# Patient Record
Sex: MALE | Race: BLACK OR AFRICAN AMERICAN | NOT HISPANIC OR LATINO | Employment: FULL TIME | URBAN - METROPOLITAN AREA
[De-identification: names, ages, dates, MRNs, and addresses within clinical notes are randomized per-mention and may not be internally consistent; named-entity substitution may affect disease eponyms.]

---

## 2019-08-28 ENCOUNTER — HOSPITAL ENCOUNTER (EMERGENCY)
Facility: HOSPITAL | Age: 29
Discharge: HOME | End: 2019-08-28
Attending: EMERGENCY MEDICINE
Payer: MEDICAID

## 2019-08-28 VITALS
TEMPERATURE: 99 F | RESPIRATION RATE: 18 BRPM | HEART RATE: 52 BPM | SYSTOLIC BLOOD PRESSURE: 127 MMHG | OXYGEN SATURATION: 98 % | WEIGHT: 230 LBS | HEIGHT: 72 IN | BODY MASS INDEX: 31.15 KG/M2 | DIASTOLIC BLOOD PRESSURE: 86 MMHG

## 2019-08-28 DIAGNOSIS — R10.13 EPIGASTRIC ABDOMINAL PAIN: Primary | ICD-10-CM

## 2019-08-28 LAB
ALBUMIN SERPL BCP-MCNC: 4.4 G/DL (ref 3.5–5.2)
ALP SERPL-CCNC: 63 U/L (ref 55–135)
ALT SERPL W/O P-5'-P-CCNC: 29 U/L (ref 10–44)
ANION GAP SERPL CALC-SCNC: 7 MMOL/L (ref 8–16)
AST SERPL-CCNC: 29 U/L (ref 10–40)
BASOPHILS # BLD AUTO: 0.01 K/UL (ref 0–0.2)
BASOPHILS NFR BLD: 0.2 % (ref 0–1.9)
BILIRUB SERPL-MCNC: 0.4 MG/DL (ref 0.1–1)
BUN SERPL-MCNC: 10 MG/DL (ref 6–20)
CALCIUM SERPL-MCNC: 10.5 MG/DL (ref 8.7–10.5)
CHLORIDE SERPL-SCNC: 103 MMOL/L (ref 95–110)
CO2 SERPL-SCNC: 30 MMOL/L (ref 23–29)
CREAT SERPL-MCNC: 1.1 MG/DL (ref 0.5–1.4)
DIFFERENTIAL METHOD: NORMAL
EOSINOPHIL # BLD AUTO: 0.1 K/UL (ref 0–0.5)
EOSINOPHIL NFR BLD: 0.8 % (ref 0–8)
ERYTHROCYTE [DISTWIDTH] IN BLOOD BY AUTOMATED COUNT: 13.2 % (ref 11.5–14.5)
EST. GFR  (AFRICAN AMERICAN): >60 ML/MIN/1.73 M^2
EST. GFR  (NON AFRICAN AMERICAN): >60 ML/MIN/1.73 M^2
GLUCOSE SERPL-MCNC: 90 MG/DL (ref 70–110)
HCT VFR BLD AUTO: 44.7 % (ref 40–54)
HGB BLD-MCNC: 14.7 G/DL (ref 14–18)
LIPASE SERPL-CCNC: 23 U/L (ref 4–60)
LYMPHOCYTES # BLD AUTO: 2.2 K/UL (ref 1–4.8)
LYMPHOCYTES NFR BLD: 34.6 % (ref 18–48)
MCH RBC QN AUTO: 29.7 PG (ref 27–31)
MCHC RBC AUTO-ENTMCNC: 32.9 G/DL (ref 32–36)
MCV RBC AUTO: 90 FL (ref 82–98)
MONOCYTES # BLD AUTO: 0.4 K/UL (ref 0.3–1)
MONOCYTES NFR BLD: 6.7 % (ref 4–15)
NEUTROPHILS # BLD AUTO: 3.6 K/UL (ref 1.8–7.7)
NEUTROPHILS NFR BLD: 57.7 % (ref 38–73)
PLATELET # BLD AUTO: 282 K/UL (ref 150–350)
PMV BLD AUTO: 9.9 FL (ref 9.2–12.9)
POTASSIUM SERPL-SCNC: 4.6 MMOL/L (ref 3.5–5.1)
PROT SERPL-MCNC: 8.8 G/DL (ref 6–8.4)
RBC # BLD AUTO: 4.95 M/UL (ref 4.6–6.2)
SODIUM SERPL-SCNC: 140 MMOL/L (ref 136–145)
WBC # BLD AUTO: 6.28 K/UL (ref 3.9–12.7)

## 2019-08-28 PROCEDURE — 96375 TX/PRO/DX INJ NEW DRUG ADDON: CPT

## 2019-08-28 PROCEDURE — 80053 COMPREHEN METABOLIC PANEL: CPT

## 2019-08-28 PROCEDURE — 83690 ASSAY OF LIPASE: CPT

## 2019-08-28 PROCEDURE — S0028 INJECTION, FAMOTIDINE, 20 MG: HCPCS | Performed by: PHYSICIAN ASSISTANT

## 2019-08-28 PROCEDURE — 99284 EMERGENCY DEPT VISIT MOD MDM: CPT | Mod: 25

## 2019-08-28 PROCEDURE — 63600175 PHARM REV CODE 636 W HCPCS: Performed by: PHYSICIAN ASSISTANT

## 2019-08-28 PROCEDURE — 85025 COMPLETE CBC W/AUTO DIFF WBC: CPT

## 2019-08-28 PROCEDURE — 25000003 PHARM REV CODE 250: Performed by: PHYSICIAN ASSISTANT

## 2019-08-28 PROCEDURE — 96374 THER/PROPH/DIAG INJ IV PUSH: CPT

## 2019-08-28 RX ORDER — FAMOTIDINE 10 MG/ML
20 INJECTION INTRAVENOUS
Status: COMPLETED | OUTPATIENT
Start: 2019-08-28 | End: 2019-08-28

## 2019-08-28 RX ORDER — FENTANYL CITRATE 50 UG/ML
50 INJECTION, SOLUTION INTRAMUSCULAR; INTRAVENOUS
Status: DISCONTINUED | OUTPATIENT
Start: 2019-08-28 | End: 2019-08-28

## 2019-08-28 RX ORDER — FAMOTIDINE 20 MG/1
20 TABLET, FILM COATED ORAL 2 TIMES DAILY
Qty: 20 TABLET | Refills: 0 | OUTPATIENT
Start: 2019-08-28 | End: 2020-11-04

## 2019-08-28 RX ORDER — ACETAMINOPHEN 500 MG
1000 TABLET ORAL EVERY 8 HOURS
Refills: 0 | COMMUNITY
Start: 2019-08-28 | End: 2019-09-04

## 2019-08-28 RX ORDER — SUCRALFATE 1 G/1
1 TABLET ORAL
Qty: 30 TABLET | Refills: 0 | Status: SHIPPED | OUTPATIENT
Start: 2019-08-28

## 2019-08-28 RX ORDER — KETOROLAC TROMETHAMINE 30 MG/ML
15 INJECTION, SOLUTION INTRAMUSCULAR; INTRAVENOUS
Status: COMPLETED | OUTPATIENT
Start: 2019-08-28 | End: 2019-08-28

## 2019-08-28 RX ADMIN — LIDOCAINE HYDROCHLORIDE: 20 SOLUTION ORAL; TOPICAL at 11:08

## 2019-08-28 RX ADMIN — FAMOTIDINE 20 MG: 10 INJECTION INTRAVENOUS at 11:08

## 2019-08-28 RX ADMIN — KETOROLAC TROMETHAMINE 15 MG: 30 INJECTION, SOLUTION INTRAMUSCULAR; INTRAVENOUS at 01:08

## 2019-08-28 NOTE — ED PROVIDER NOTES
"Encounter Date: 8/28/2019    SCRIBE #1 NOTE: I, Roxanne Hatfield, am scribing for, and in the presence of,  DEVON Romero. I have scribed the following portions of the note - Other sections scribed: HPI,ROS, PE.       History     Chief Complaint   Patient presents with    Abdominal Pain     Sharp abdominal pain for last few days, denying n/v/d, rates pain 10/10     CC: Abdominal pain    HPI:  This is a 29 y.o. male with no pertinent PMHx who presents to the Emergency Department with a cc of epigastric abdominal pain that radiates to his left upper abdomen and through his back that has been intermittent for the past 4 days. Rates 10/10 in severity. Pt reports he was woken up from his sleep due to his pain 8 hours ago; he notes his pain has worsened and has been constant today. Pt notes when he eats he gets heartburn and his pain also worsens. He notes it feels like "something is in me." Pt denies nausea, vomiting, diarrhea, melena, hematochezia, dysuria, penile discharge, chest pain, or shortness of breath. Symptoms are worsened by sitting, movement, and turning of his body. Denies taking any recent medication prior to arrival. Denies recent abdominal surgeries or trauma to the area. Patient denies prior history of similar symptoms. Denies any alleviating factors. Reports he occasionally drinks beers and denies consuming water often.     The history is provided by the patient. No  was used.     Review of patient's allergies indicates:  No Known Allergies  History reviewed. No pertinent past medical history.  History reviewed. No pertinent surgical history.  History reviewed. No pertinent family history.  Social History     Tobacco Use    Smoking status: Current Every Day Smoker   Substance Use Topics    Alcohol use: Yes    Drug use: Never     Review of Systems   Constitutional: Negative for fever.   HENT: Negative for sore throat.    Respiratory: Negative for shortness of breath.  "   Cardiovascular: Negative for chest pain.   Gastrointestinal: Positive for abdominal pain. Negative for diarrhea, nausea and vomiting.   Genitourinary: Negative for discharge and dysuria.        (-)Melena  (-)Hematochezia   Musculoskeletal: Positive for back pain.   Skin: Negative for rash.   Neurological: Negative for weakness.   Hematological: Does not bruise/bleed easily.       Physical Exam     Initial Vitals [08/28/19 0951]   BP Pulse Resp Temp SpO2   137/84 76 16 98.1 °F (36.7 °C) 99 %      MAP       --         Physical Exam    Nursing note and vitals reviewed.  Constitutional: He appears well-developed and well-nourished. No distress.   HENT:   Head: Normocephalic and atraumatic.   Right Ear: External ear normal.   Left Ear: External ear normal.   Nose: Nose normal.   Eyes: Conjunctivae and EOM are normal.   Neck: Neck supple.   Cardiovascular: Normal rate and normal heart sounds.   Pulmonary/Chest: No stridor.   Abdominal: There is tenderness in the epigastric area. There is negative Ortiz's sign.   Musculoskeletal:        Cervical back: Normal.        Thoracic back: Normal.        Lumbar back: Normal.   Neurological: He is alert and oriented to person, place, and time. No cranial nerve deficit.   Skin: Skin is warm. No pallor.   Psychiatric: He has a normal mood and affect. Thought content normal.         ED Course   Procedures  Labs Reviewed   COMPREHENSIVE METABOLIC PANEL - Abnormal; Notable for the following components:       Result Value    CO2 30 (*)     Total Protein 8.8 (*)     Anion Gap 7 (*)     All other components within normal limits   CBC W/ AUTO DIFFERENTIAL   LIPASE          Imaging Results          X-Ray Chest 1 View (Final result)  Result time 08/28/19 12:51:27    Final result by Hamlet Long MD (08/28/19 12:51:27)                 Impression:      No acute cardiopulmonary processes.      Electronically signed by: Hamlet Long MD  Date:    08/28/2019  Time:    12:51             Narrative:     EXAMINATION:  XR CHEST 1 VIEW    CLINICAL HISTORY:  Epigastric pain    TECHNIQUE:  Single frontal view of the chest was performed.    COMPARISON:  None    FINDINGS:  Cardiomediastinal silhouette is within normal limits.  There is no tracheal abnormality.    Lungs are well inflated.  No lobar consolidations or pneumothorax or pulmonary vascular congestion or pleural effusions.  The visualized ribs demonstrate no significant abnormalities.                                     Medications   famotidine (PF) injection 20 mg (20 mg Intravenous Given 8/28/19 1139)   (pyxis) gi cocktail (mylanta 30 mL, lidocaine 2 % viscous 10 mL, dicyclomine 10 mL) 50 mL ( Oral Given 8/28/19 1138)   ketorolac injection 15 mg (15 mg Intravenous Given 8/28/19 1307)          APC / Resident Notes:   Patient presents to the ER for evaluation of epigastric abdominal pain x4 days.  Patient says this pain was initially intermittent, but has been constant since early this morning.  Patient's pain is worse with sitting, movements, eating.  I have considered GERD/gastritis,  PUD, musculoskeletal pain, acute cholecystitis viral gastroenteritis. No chest pain , SOB, or exertional pain to suggest ACS.  Will obtain blood work, give IV Pepcid, GI cocktail and reassess.  Patient does not have improvement of pain with medications given.  He is given Toradol in addition and reports significant improvement of his pain.  Chest x-ray is negative for acute or infectious process.  I discussed the care this patient with my supervising physician, Dr. Silva. Bedside ultrasound is performed by Dr Silva without evidence of acute cholecystitis.  Patient's blood work was unremarkable. No leukocytosis, elevated LFTs or lipase, signs of dehydration or STAN.   Fentanyl was ordered for patient's pain, but he has declined this as he says he feels better.  This is likely GERD/gastritis or musculoskeletal pain.  Will send home with prescription for Pepcid, sucralfate, and  he is advised to also take Tylenol as needed for pain. Patient is advised on close follow-up with a primary care physician for ER follow-up exam.  He is given ER return precautions.       Scribe Attestation:   Scribe #1: I performed the above scribed service and the documentation accurately describes the services I performed. I attest to the accuracy of the note.               Clinical Impression:       ICD-10-CM ICD-9-CM   1. Epigastric abdominal pain R10.13 789.06            I, Lexus Ayala PA-C, personally performed the services described in this documentation. All medical record entries made by the scribe were at my direction and in my presence. I have reviewed the chart and agree that the record reflects my personal performance and is accurate and complete.                    DEVON Maharaj  08/28/19 8419

## 2020-04-22 ENCOUNTER — HOSPITAL ENCOUNTER (EMERGENCY)
Facility: HOSPITAL | Age: 30
Discharge: HOME | End: 2020-04-22
Attending: EMERGENCY MEDICINE
Payer: COMMERCIAL

## 2020-04-22 VITALS
TEMPERATURE: 98 F | RESPIRATION RATE: 18 BRPM | DIASTOLIC BLOOD PRESSURE: 80 MMHG | BODY MASS INDEX: 31.15 KG/M2 | OXYGEN SATURATION: 99 % | HEIGHT: 72 IN | HEART RATE: 82 BPM | SYSTOLIC BLOOD PRESSURE: 121 MMHG | WEIGHT: 230 LBS

## 2020-04-22 DIAGNOSIS — S61.216A LACERATION OF RIGHT LITTLE FINGER WITHOUT FOREIGN BODY WITHOUT DAMAGE TO NAIL, INITIAL ENCOUNTER: Primary | ICD-10-CM

## 2020-04-22 PROCEDURE — 90471 IMMUNIZATION ADMIN: CPT | Mod: ER | Performed by: EMERGENCY MEDICINE

## 2020-04-22 PROCEDURE — 12001 RPR S/N/AX/GEN/TRNK 2.5CM/<: CPT | Mod: ER

## 2020-04-22 PROCEDURE — 99284 EMERGENCY DEPT VISIT MOD MDM: CPT | Mod: 25,ER

## 2020-04-22 PROCEDURE — 90714 TD VACC NO PRESV 7 YRS+ IM: CPT | Mod: ER | Performed by: EMERGENCY MEDICINE

## 2020-04-22 PROCEDURE — 63600175 PHARM REV CODE 636 W HCPCS: Mod: ER | Performed by: EMERGENCY MEDICINE

## 2020-04-22 RX ADMIN — CLOSTRIDIUM TETANI TOXOID ANTIGEN (FORMALDEHYDE INACTIVATED) AND CORYNEBACTERIUM DIPHTHERIAE TOXOID ANTIGEN (FORMALDEHYDE INACTIVATED) 0.5 ML: 5; 2 INJECTION, SUSPENSION INTRAMUSCULAR at 11:04

## 2020-04-23 NOTE — ED PROVIDER NOTES
Encounter Date: 4/22/2020    SCRIBE #1 NOTE: I, Hafsa Mcdonald, am scribing for, and in the presence of,  Dr. Devlin. I have scribed the following portions of the note - Other sections scribed: HPI, ROS, PE.       History     Chief Complaint   Patient presents with    Laceration     reports cutting his right small finger while dissembling a tent 20 minutes PTA. 1-2cm laceration noted to pinky finger. bleeding controlled at this time. pt is unsure of last tetanus     30 year old male complains of a 1 cm laceration to the right pinky finger 20 minutes PTA. He reports dissembling a tent and cutting it. Bleeding is controlled at this time. Unknown of last tetanus shot.    The history is provided by the patient. No  was used.     Review of patient's allergies indicates:  No Known Allergies  History reviewed. No pertinent past medical history.  History reviewed. No pertinent surgical history.  History reviewed. No pertinent family history.  Social History     Tobacco Use    Smoking status: Current Every Day Smoker   Substance Use Topics    Alcohol use: Yes    Drug use: Never     Review of Systems   Constitutional: Negative.  Negative for fever.   HENT: Negative.  Negative for sore throat.    Eyes: Negative.  Negative for pain.   Respiratory: Negative.  Negative for shortness of breath.    Cardiovascular: Negative.  Negative for chest pain.   Gastrointestinal: Negative.  Negative for abdominal pain and vomiting.   Genitourinary: Negative.  Negative for dysuria.   Musculoskeletal: Negative.  Negative for back pain.   Skin: Negative.  Negative for rash.   Neurological: Negative.  Negative for headaches.   Hematological: Negative.    Psychiatric/Behavioral: Negative.    All other systems reviewed and are negative.      Physical Exam     Initial Vitals [04/22/20 2255]   BP Pulse Resp Temp SpO2   121/80 82 18 98.2 °F (36.8 °C) 99 %      MAP       --         Physical Exam    Nursing note and vitals  reviewed.  Constitutional: He appears well-developed and well-nourished.   HENT:   Head: Normocephalic and atraumatic.   Eyes: Conjunctivae and EOM are normal.   Neck: Normal range of motion. Neck supple.   Cardiovascular: Normal rate and intact distal pulses.   Pulmonary/Chest: Effort normal. No respiratory distress.   Abdominal: Soft. There is no tenderness.   Musculoskeletal: Normal range of motion.        Right hand: He exhibits normal range of motion.   No tendon is intact.    Neurological: He is alert and oriented to person, place, and time.   No nerve deficit   Skin: Skin is warm and dry. Laceration (v-shaped 1 cm) noted.   Positive for partial thickness.    Psychiatric: He has a normal mood and affect. His behavior is normal.         ED Course   Lac Repair  Date/Time: 4/22/2020 11:08 PM  Performed by: Aguilar Devlin MD  Authorized by: Aguilar Devlin MD   Consent Done: Emergent Situation  Body area: upper extremity  Location details: right small finger  Laceration length: 1.5 cm  Foreign bodies: no foreign bodies  Tendon involvement: none  Nerve involvement: none  Vascular damage: no  Patient sedated: no  Preparation: Patient was prepped and draped in the usual sterile fashion.  Irrigation solution: saline  Irrigation method: syringe  Amount of cleaning: standard  Debridement: none  Degree of undermining: none  Skin closure: glue  Technique: simple  Approximation: close  Approximation difficulty: simple  Dressing: open (no dressing)  Patient tolerance: Patient tolerated the procedure well with no immediate complications        Labs Reviewed - No data to display       Imaging Results    None          Medical Decision Making:   History:   Old Medical Records: I decided to obtain old medical records.            Scribe Attestation:   Scribe #1: I performed the above scribed service and the documentation accurately describes the services I performed. I attest to the accuracy of the note.    This  document was produced by a scribe under my direction and in my presence. I agree with the content of the note and have made any necessary edits.     Aguilar Devlin MD    04/22/2020 11:09 PM                      Clinical Impression:     1. Laceration of right little finger without foreign body without damage to nail, initial encounter                ED Disposition Condition    Discharge Stable        ED Prescriptions     None        Follow-up Information     Follow up With Specialties Details Why Contact Info    Primary Doctor No  Schedule an appointment as soon as possible for a visit  As needed                                      Aguilar Devlin MD  04/22/20 8104

## 2020-11-04 ENCOUNTER — HOSPITAL ENCOUNTER (EMERGENCY)
Facility: HOSPITAL | Age: 30
Discharge: HOME | End: 2020-11-04
Attending: EMERGENCY MEDICINE
Payer: MEDICAID

## 2020-11-04 VITALS
BODY MASS INDEX: 31.19 KG/M2 | OXYGEN SATURATION: 99 % | HEART RATE: 51 BPM | TEMPERATURE: 98 F | RESPIRATION RATE: 20 BRPM | WEIGHT: 230 LBS | SYSTOLIC BLOOD PRESSURE: 128 MMHG | DIASTOLIC BLOOD PRESSURE: 78 MMHG

## 2020-11-04 DIAGNOSIS — K29.00 ACUTE GASTRITIS WITHOUT HEMORRHAGE, UNSPECIFIED GASTRITIS TYPE: Primary | ICD-10-CM

## 2020-11-04 DIAGNOSIS — R11.2 NON-INTRACTABLE VOMITING WITH NAUSEA, UNSPECIFIED VOMITING TYPE: ICD-10-CM

## 2020-11-04 DIAGNOSIS — R10.13 EPIGASTRIC PAIN: ICD-10-CM

## 2020-11-04 LAB
ALBUMIN SERPL-MCNC: 4.4 G/DL (ref 3.3–5.5)
ALBUMIN SERPL-MCNC: 4.5 G/DL (ref 3.3–5.5)
ALP SERPL-CCNC: 59 U/L (ref 42–141)
ALP SERPL-CCNC: 61 U/L (ref 42–141)
BILIRUB SERPL-MCNC: 0.9 MG/DL (ref 0.2–1.6)
BILIRUB SERPL-MCNC: 0.9 MG/DL (ref 0.2–1.6)
BILIRUBIN, POC UA: NEGATIVE
BLOOD, POC UA: ABNORMAL
BUN SERPL-MCNC: 9 MG/DL (ref 7–22)
CALCIUM SERPL-MCNC: 10.7 MG/DL (ref 8–10.3)
CHLORIDE SERPL-SCNC: 95 MMOL/L (ref 98–108)
CLARITY, POC UA: CLEAR
COLOR, POC UA: YELLOW
CREAT SERPL-MCNC: 0.9 MG/DL (ref 0.6–1.2)
GLUCOSE SERPL-MCNC: 107 MG/DL (ref 73–118)
GLUCOSE, POC UA: NEGATIVE
KETONES, POC UA: NEGATIVE
LEUKOCYTE EST, POC UA: NEGATIVE
NITRITE, POC UA: NEGATIVE
PH UR STRIP: 7 [PH]
POC ALT (SGPT): 20 U/L (ref 10–47)
POC ALT (SGPT): 21 U/L (ref 10–47)
POC AMYLASE: 62 U/L (ref 14–97)
POC AST (SGOT): 34 U/L (ref 11–38)
POC AST (SGOT): 45 U/L (ref 11–38)
POC GGT: 59 U/L (ref 5–65)
POC TCO2: 34 MMOL/L (ref 18–33)
POTASSIUM BLD-SCNC: 3.8 MMOL/L (ref 3.6–5.1)
PROTEIN, POC UA: ABNORMAL
PROTEIN, POC: 9 G/DL (ref 6.4–8.1)
PROTEIN, POC: 9.1 G/DL (ref 6.4–8.1)
SODIUM BLD-SCNC: 139 MMOL/L (ref 128–145)
SPECIFIC GRAVITY, POC UA: 1.02
UROBILINOGEN, POC UA: 1 E.U./DL

## 2020-11-04 PROCEDURE — 99285 EMERGENCY DEPT VISIT HI MDM: CPT | Mod: 25,ER

## 2020-11-04 PROCEDURE — 80053 COMPREHEN METABOLIC PANEL: CPT | Mod: ER

## 2020-11-04 PROCEDURE — 96376 TX/PRO/DX INJ SAME DRUG ADON: CPT | Mod: ER

## 2020-11-04 PROCEDURE — 85025 COMPLETE CBC W/AUTO DIFF WBC: CPT | Mod: ER

## 2020-11-04 PROCEDURE — 96361 HYDRATE IV INFUSION ADD-ON: CPT | Mod: ER

## 2020-11-04 PROCEDURE — 81003 URINALYSIS AUTO W/O SCOPE: CPT | Mod: ER

## 2020-11-04 PROCEDURE — 25000003 PHARM REV CODE 250: Mod: ER | Performed by: PHYSICIAN ASSISTANT

## 2020-11-04 PROCEDURE — 96374 THER/PROPH/DIAG INJ IV PUSH: CPT | Mod: ER

## 2020-11-04 PROCEDURE — 82150 ASSAY OF AMYLASE: CPT | Mod: ER

## 2020-11-04 PROCEDURE — 63600175 PHARM REV CODE 636 W HCPCS: Mod: ER | Performed by: PHYSICIAN ASSISTANT

## 2020-11-04 PROCEDURE — 25500020 PHARM REV CODE 255: Mod: ER | Performed by: EMERGENCY MEDICINE

## 2020-11-04 PROCEDURE — 96375 TX/PRO/DX INJ NEW DRUG ADDON: CPT | Mod: ER

## 2020-11-04 RX ORDER — SODIUM CHLORIDE 9 MG/ML
1000 INJECTION, SOLUTION INTRAVENOUS
Status: COMPLETED | OUTPATIENT
Start: 2020-11-04 | End: 2020-11-04

## 2020-11-04 RX ORDER — LIDOCAINE HYDROCHLORIDE 20 MG/ML
5 SOLUTION OROPHARYNGEAL
Status: COMPLETED | OUTPATIENT
Start: 2020-11-04 | End: 2020-11-04

## 2020-11-04 RX ORDER — MORPHINE SULFATE 4 MG/ML
4 INJECTION, SOLUTION INTRAMUSCULAR; INTRAVENOUS
Status: COMPLETED | OUTPATIENT
Start: 2020-11-04 | End: 2020-11-04

## 2020-11-04 RX ORDER — ONDANSETRON 2 MG/ML
4 INJECTION INTRAMUSCULAR; INTRAVENOUS
Status: COMPLETED | OUTPATIENT
Start: 2020-11-04 | End: 2020-11-04

## 2020-11-04 RX ORDER — DICYCLOMINE HYDROCHLORIDE 10 MG/1
20 CAPSULE ORAL
Status: COMPLETED | OUTPATIENT
Start: 2020-11-04 | End: 2020-11-04

## 2020-11-04 RX ORDER — FAMOTIDINE 10 MG/ML
20 INJECTION INTRAVENOUS
Status: COMPLETED | OUTPATIENT
Start: 2020-11-04 | End: 2020-11-04

## 2020-11-04 RX ORDER — PANTOPRAZOLE SODIUM 20 MG/1
20 TABLET, DELAYED RELEASE ORAL DAILY
Qty: 30 TABLET | Refills: 1 | Status: SHIPPED | OUTPATIENT
Start: 2020-11-04 | End: 2020-12-13

## 2020-11-04 RX ORDER — ONDANSETRON 4 MG/1
4 TABLET, FILM COATED ORAL EVERY 6 HOURS PRN
Qty: 12 TABLET | Refills: 0 | Status: SHIPPED | OUTPATIENT
Start: 2020-11-04

## 2020-11-04 RX ORDER — DICYCLOMINE HYDROCHLORIDE 20 MG/1
20 TABLET ORAL 2 TIMES DAILY
Qty: 20 TABLET | Refills: 0 | Status: SHIPPED | OUTPATIENT
Start: 2020-11-04 | End: 2020-12-04

## 2020-11-04 RX ORDER — FAMOTIDINE 20 MG/1
20 TABLET, FILM COATED ORAL 2 TIMES DAILY
Qty: 20 TABLET | Refills: 0 | Status: SHIPPED | OUTPATIENT
Start: 2020-11-04 | End: 2021-11-04

## 2020-11-04 RX ADMIN — FAMOTIDINE 20 MG: 10 INJECTION INTRAVENOUS at 01:11

## 2020-11-04 RX ADMIN — IOHEXOL 100 ML: 350 INJECTION, SOLUTION INTRAVENOUS at 02:11

## 2020-11-04 RX ADMIN — ONDANSETRON 4 MG: 2 SOLUTION INTRAMUSCULAR; INTRAVENOUS at 12:11

## 2020-11-04 RX ADMIN — DICYCLOMINE HYDROCHLORIDE 20 MG: 10 CAPSULE ORAL at 01:11

## 2020-11-04 RX ADMIN — LIDOCAINE HYDROCHLORIDE 5 ML: 20 SOLUTION ORAL; TOPICAL at 01:11

## 2020-11-04 RX ADMIN — MORPHINE SULFATE 4 MG: 4 INJECTION INTRAVENOUS at 01:11

## 2020-11-04 RX ADMIN — SODIUM CHLORIDE 1000 ML: 0.9 INJECTION, SOLUTION INTRAVENOUS at 12:11

## 2020-11-04 RX ADMIN — ONDANSETRON 4 MG: 2 SOLUTION INTRAMUSCULAR; INTRAVENOUS at 01:11

## 2020-11-04 NOTE — ED NOTES
Patient sitting up in bed playing on phone, no distress noted; reports pain 4-5/10 at present; denies nausea; will continue to monitor

## 2020-11-04 NOTE — ED PROVIDER NOTES
"Encounter Date: 11/4/2020    SCRIBE #1 NOTE: I, Laura Jhonatan, am scribing for, and in the presence of,  DEVON Lucero. I have scribed the following portions of the note - Other sections scribed: HPI, ROS, PE.       History     Chief Complaint   Patient presents with    Abdominal Pain     Pt states," My stomach has been hurting since yesterday. I feel like I want to vomit."     Moe Ken is a 30 y.o. male who presents to the ED complaining of acute, 5/10, "squeezing", epigastric abdominal pain x yesterday. Endorses nausea and vomiting. Reports latest episode of emesis x today (5am). Pain reproduced with palpation. Notes possible bad food intake via [employer] x 2 days ago (at 3pm). Denies hematemesis, diarrhea, BRBPR and melena. Denies dysuria, frequency, urgency and hematuria. Denies fever, chills, nasal congestion, rhinorrhea, cough, sore throat and body aches. Denies relief s/p Pepto Bismol, Tums and hot tea. Denies daily medications. Denies PSHx.     The history is provided by the patient. No  was used.     Review of patient's allergies indicates:  No Known Allergies  History reviewed. No pertinent past medical history.  History reviewed. No pertinent surgical history.  History reviewed. No pertinent family history.  Social History     Tobacco Use    Smoking status: Current Every Day Smoker    Smokeless tobacco: Never Used   Substance Use Topics    Alcohol use: Yes    Drug use: Never     Review of Systems   Constitutional: Negative for chills and fever.   HENT: Negative for congestion, rhinorrhea and sore throat.    Eyes: Negative for visual disturbance.   Respiratory: Negative for cough and shortness of breath.    Cardiovascular: Negative for chest pain.   Gastrointestinal: Positive for abdominal pain, nausea and vomiting. Negative for anal bleeding, blood in stool, constipation and diarrhea.   Genitourinary: Negative for dysuria, flank pain, frequency, hematuria and " urgency.   Musculoskeletal: Negative for back pain.   Skin: Negative for rash.   Neurological: Negative for headaches.   All other systems reviewed and are negative.      Physical Exam     Initial Vitals [11/04/20 1124]   BP Pulse Resp Temp SpO2   (!) 162/93 62 16 98 °F (36.7 °C) 98 %      MAP       --         Physical Exam    Nursing note and vitals reviewed.  Constitutional: He appears well-developed and well-nourished.   HENT:   Head: Normocephalic and atraumatic.   Right Ear: External ear normal.   Left Ear: External ear normal.   Nose: Nose normal.   Eyes: Conjunctivae and EOM are normal.   Neck: Normal range of motion. Neck supple. No thyromegaly present. No JVD present.   Cardiovascular: Normal rate and regular rhythm. Exam reveals no gallop and no friction rub.    No murmur heard.  Pulmonary/Chest: Breath sounds normal. No respiratory distress.   Abdominal: Soft. Bowel sounds are normal. There is abdominal tenderness (mild) in the epigastric area. There is no rigidity, no rebound and no guarding.   Musculoskeletal: Normal range of motion. No tenderness or edema.   Neurological: He is alert and oriented to person, place, and time. He has normal strength. GCS score is 15. GCS eye subscore is 4. GCS verbal subscore is 5. GCS motor subscore is 6.   Skin: Skin is warm and dry. Capillary refill takes less than 2 seconds.   Psychiatric: He has a normal mood and affect.         ED Course   Procedures  Labs Reviewed   POCT URINALYSIS W/O SCOPE - Abnormal; Notable for the following components:       Result Value    Blood, UA 1+ (*)     Protein, UA Trace (*)     All other components within normal limits   POCT LIVER PANEL - Abnormal; Notable for the following components:    AST (SGOT), POC 45 (*)     Protein 9.1 (*)     All other components within normal limits   POCT CMP - Abnormal; Notable for the following components:    Calcium, POC 10.7 (*)     POC Chloride 95 (*)     POC TCO2 34 (*)     Protein 9.0 (*)     All  other components within normal limits   POCT CBC   POCT URINALYSIS W/O SCOPE   POCT CMP   POCT LIVER PANEL              Imaging Results           CT Abdomen Pelvis With Contrast (Final result)  Result time 11/04/20 14:15:54    Final result by Adrián Ayers MD (11/04/20 14:15:54)                 Impression:      1. Moderate to severe circumferential thickening of the distal body and antral gastric walls associated with marked submucosal edema but, no evidence of mural emphysema, free air, free fluid or loculated/drainable fluid collection.  Findings are compatible with acute gastritis of uncertain etiology.  This report was flagged in Epic as abnormal.      Electronically signed by: Adrián Ayers  Date:    11/04/2020  Time:    14:15             Narrative:    EXAMINATION:  CT ABDOMEN PELVIS WITH CONTRAST    CLINICAL HISTORY:  Abdominal pain, acute, nonlocalized;    TECHNIQUE:  Low dose axial images, sagittal and coronal reformations were obtained from the lung bases to the pubic symphysis following the IV administration of 100 mL of Omnipaque 350.  Oral contrast was not administered, limiting evaluation of hollow viscus organs.    COMPARISON:  None    FINDINGS:  Abdomen:    - Lower thorax:Heart is not enlarged.  No significant pericardial thickening in the field of view.    - Lung bases: Imaged portions of the lung bases are clear.    - Liver: Liver is normal in size, attenuations and morphology without appreciable surface nodularity. No appreciable sizable suspicious lesion.  No intrahepatic/extrahepatic biliary dilatation.    - Gallbladder: No radiodense gallstones, mural thickening, pericholecystic fluid or pericholecystic fat stranding.    - Pancreas: Pancreas is normal in size and attenuation without the surrounding inflammatory fat stranding, suspicious mass or fluid/fluid collection.    - Spleen: Spleen is normal in size, morphology and attenuation without appreciable suspicious lesion.    - Adrenals:  Bilateral adrenal glands are normal in size and morphology without appreciable nodularity.    - Kidneys: Kidneys are normal in size, location, morphology and attenuation. No appreciable suspicious sizable lesion, nephroliths or hydroureteronephrosis bilaterally.  Urinary bladder is completely collapsed, limiting evaluation.    - Bowel/Mesentery: Moderate to severe circumferential thickening of the distal body and antral gastric wall with decreased wall attenuation/marked edema of the submucosa.  No evidence of mural emphysema, free air, free fluid or loculated/drainable fluid collection.  Otherwise, the enhanced but unopacified small bowel and colon are normal in course and caliber.  No evidence of small bowel obstruction, significant inflammatory fat stranding, free air or free fluid.  Appendix is normal.  Bowel mesentery is grossly unremarkable.    - Retroperitoneum:  Aorta is normal in course and caliber without evidence of aneurysmal degeneration.  No sizable retroperitoneal mass or fluid collection.    Pelvis:    - Reproductive organs: Reproductive organs are within normal limits.  No suspicious sizable pelvic mass, lymphadenopathy or fluid.    - Soft tissues:  Imaged soft tissues are grossly unremarkable.    - Bones:  Imaged osseous structures are grossly unremarkable.  No acute displaced fracture, dislocation or suspicious lytic/blastic lesion.                                 Medical Decision Making:   History:   Old Medical Records: I decided to obtain old medical records.  Clinical Tests:   Lab Tests: Ordered and Reviewed  ED Management:  Hemodynamically stable.  Nontoxic and in no acute distress.  Patient is overall well-appearing, pleasant, conversational.  CT read as moderate to severe circ of differential thickening in the distal body and antral gastric well as associated with marketed submucosal edema but no evidence of mural emphysema, free air, free fluid or loculated/drainable fluid collection.   Findings consistent with acute gastritis.  No leukocytosis.  No transaminitis.  Normal kidney function.  UA without infection.  Patient reports improvement of symptoms after medications.  Will discharge home with Pepcid, Protonix, Bentyl, Zofran.  Follow-up with GI.  Referrals to GI placed.  Results and plan discussed with patient who verbalizes understanding and is agreeable.  Strict ED return precautions given for any worsening or additional concerning symptoms.            Scribe Attestation:   Scribe #1: I performed the above scribed service and the documentation accurately describes the services I performed. I attest to the accuracy of the note.               Scribe attestation: I, Aguilar Seth, personally performed the services described in this documentation.  All medical record entries made by the scribe were at my direction and in my presence.  I have reviewed the chart and agree that the record reflects my personal performance and is accurate and complete.         Clinical Impression:     ICD-10-CM ICD-9-CM   1. Acute gastritis without hemorrhage, unspecified gastritis type  K29.00 535.00   2. Epigastric pain  R10.13 789.06   3. Non-intractable vomiting with nausea, unspecified vomiting type  R11.2 787.01                      Disposition:   Disposition: Discharged  Condition: Stable     ED Disposition Condition    Discharge Stable        ED Prescriptions     Medication Sig Dispense Start Date End Date Auth. Provider    famotidine (PEPCID) 20 MG tablet Take 1 tablet (20 mg total) by mouth 2 (two) times daily. 20 tablet 11/4/2020 11/4/2021 Aguilar Seth PA-C    pantoprazole (PROTONIX) 20 MG tablet Take 1 tablet (20 mg total) by mouth once daily. 30 tablet 11/4/2020 12/13/2020 Aguilar Seth PA-C    dicyclomine (BENTYL) 20 mg tablet Take 1 tablet (20 mg total) by mouth 2 (two) times daily. 20 tablet 11/4/2020 12/4/2020 Aguilar Seth PA-C    ondansetron (ZOFRAN) 4 MG tablet Take 1 tablet (4 mg  total) by mouth every 6 (six) hours as needed. 12 tablet 11/4/2020  Aguilar Seth PA-C        Follow-up Information     Follow up With Specialties Details Why Contact Info     [facility]  Schedule an appointment as soon as possible for a visit   [address] [facility] [address]  [phone]      [facility] Locations Gastroenterology Schedule an appointment as soon as possible for a visit   [address]  [address]  [facility] [address]  [phone]      [facility] GASTROENTEROLOGY Gastroenterology Schedule an appointment as soon as possible for a visit   [address] [facility] [address]  [phone]    [facility] Emergency Department Emergency Medicine Go to  If symptoms worsen [address] [facility] [id number] [phone]                                       Aguilar Seth PA-C  11/05/20 5235

## 2020-11-04 NOTE — DISCHARGE INSTRUCTIONS
Please take new medication as directed and follow discharge instructions provided.  Please make sure to follow-up with PCP in GI to discuss today's emergency department visit and for further evaluation and management.  Please return to the emergency department immediately if her symptoms worsen or you develop any additional concerning symptoms.

## 2020-11-04 NOTE — ED NOTES
Patient informed of need for urine sample; reports inability to void at this time; NS continues to infuse without difficulty; urinal provided; will continue to monitor